# Patient Record
Sex: FEMALE | Race: WHITE | NOT HISPANIC OR LATINO | Employment: FULL TIME | URBAN - METROPOLITAN AREA
[De-identification: names, ages, dates, MRNs, and addresses within clinical notes are randomized per-mention and may not be internally consistent; named-entity substitution may affect disease eponyms.]

---

## 2023-05-29 ENCOUNTER — HOSPITAL ENCOUNTER (EMERGENCY)
Facility: HOSPITAL | Age: 33
Discharge: HOME | End: 2023-05-29
Attending: EMERGENCY MEDICINE | Admitting: EMERGENCY MEDICINE

## 2023-05-29 VITALS
HEART RATE: 87 BPM | DIASTOLIC BLOOD PRESSURE: 104 MMHG | BODY MASS INDEX: 46.1 KG/M2 | RESPIRATION RATE: 18 BRPM | HEIGHT: 64 IN | WEIGHT: 270 LBS | OXYGEN SATURATION: 98 % | TEMPERATURE: 98.3 F | SYSTOLIC BLOOD PRESSURE: 149 MMHG
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
## 2023-05-29 DIAGNOSIS — R03.0 ELEVATED BLOOD PRESSURE READING: ICD-10-CM

## 2023-05-29 DIAGNOSIS — L03.114 CELLULITIS OF LEFT FOREARM: ICD-10-CM

## 2023-05-29 PROCEDURE — 99283 EMERGENCY DEPT VISIT LOW MDM: CPT

## 2023-05-29 PROCEDURE — 63710000001 PREDNISONE PER 1 MG: Performed by: EMERGENCY MEDICINE

## 2023-05-29 RX ORDER — SULFAMETHOXAZOLE AND TRIMETHOPRIM 800; 160 MG/1; MG/1
1 TABLET ORAL ONCE
Status: COMPLETED | OUTPATIENT
Start: 2023-05-29 | End: 2023-05-29

## 2023-05-29 RX ORDER — IBUPROFEN 400 MG/1
400 TABLET ORAL EVERY 6 HOURS PRN
Qty: 20 TABLET | Refills: 0 | Status: SHIPPED | OUTPATIENT
Start: 2023-05-29

## 2023-05-29 RX ORDER — FLUCONAZOLE 150 MG/1
150 TABLET ORAL ONCE
Qty: 1 TABLET | Refills: 0 | Status: SHIPPED | OUTPATIENT
Start: 2023-05-29 | End: 2023-05-29

## 2023-05-29 RX ORDER — SULFAMETHOXAZOLE AND TRIMETHOPRIM 800; 160 MG/1; MG/1
1 TABLET ORAL 2 TIMES DAILY
Qty: 14 TABLET | Refills: 0 | Status: SHIPPED | OUTPATIENT
Start: 2023-05-29

## 2023-05-29 RX ORDER — PREDNISONE 20 MG/1
40 TABLET ORAL ONCE
Status: COMPLETED | OUTPATIENT
Start: 2023-05-29 | End: 2023-05-29

## 2023-05-29 RX ADMIN — PREDNISONE 40 MG: 20 TABLET ORAL at 20:42

## 2023-05-29 RX ADMIN — SULFAMETHOXAZOLE AND TRIMETHOPRIM 1 TABLET: 800; 160 TABLET ORAL at 20:42

## 2023-05-30 NOTE — ED PROVIDER NOTES
Subjective   History of Present Illness  Patient is a 32-year-old female presenting with insect bites.  Patient feels like that they are spider bites.  She first noticed one between her first and second toe.  She then noticed one of the base of the right foot.  She has since had 1 on the left forearm.  Patient started some doxycycline she has had leftover and is taken 4 doses.  Patient reports swelling and erythema of the one on the left arm.  No fever or chills.  Patient did not witness any spiders.    History provided by:  Patient      Review of Systems    No past medical history on file.    Allergies   Allergen Reactions   • Eucalyptol Anaphylaxis   • Shellfish-Derived Products Anaphylaxis   • Benadryl [Diphenhydramine] Hallucinations   • Tomato GI Intolerance   • Trazodone Hallucinations   • Vistaril [Hydroxyzine] Hallucinations       No past surgical history on file.    No family history on file.    Social History     Socioeconomic History   • Marital status: Single           Objective   Physical Exam  Vitals and nursing note reviewed.   Constitutional:       Appearance: Normal appearance. She is obese.   Pulmonary:      Effort: No respiratory distress.   Musculoskeletal:         General: Normal range of motion.   Skin:     General: Skin is warm and dry.      Comments: Appearance of resolving insect bite between the first and second toe of the left foot.  Insect bite on the dorsal aspect of the right foot at the base of the toes.  Another bite on the left forearm on the dorsal aspect with surrounding erythema and mild swelling.  No fluctuance or evidence of active abscess.  Neurovascular intact   Neurological:      Mental Status: She is alert and oriented to person, place, and time.   Psychiatric:         Mood and Affect: Mood normal.         Behavior: Behavior normal.         Procedures           ED Course  ED Course as of 05/29/23 2031   Mon May 29, 2023   2030 Patient with evidence of multiple insect bites.   I did review some old records and the patient has been seen at other hospitals for cellulitis and insect bites with most recent being abdominal cellulitis from insect bite.  See that note for details.  Otherwise, the foot.  Seem to be progressing well.  The left arm does demonstrate the surrounding cellulitis approximately 8 cm x 12 cm.  No evidence of abscess or active fluid collection.  Will discharge with antibiotic therapy to follow-up with symptomatic management.  Patient is to return to the ER for any concerns or worsening. [RS]      ED Course User Index  [RS] Cliff Kirk MD                                           Medical Decision Making  Risk  Prescription drug management.          Final diagnoses:   Insect bite, unspecified site, initial encounter   Cellulitis of left forearm   Elevated blood pressure reading       ED Disposition  ED Disposition     ED Disposition   Discharge    Condition   Stable    Comment   --             [facility] Emergency Department  [address] [id number] [phone]    As needed, If symptoms worsen or ANY concerns.    [facility]IENT CONNE[facility] - [place] [address]  [phone]  Schedule an appointment as soon as possible for a visit            Medication List      New Prescriptions    fluconazole 150 MG tablet  Commonly known as: DIFLUCAN  Take 1 tablet by mouth 1 (One) Time for 1 dose. Upon completion of antibiotics     ibuprofen 400 MG tablet  Commonly known as: IBU  Take 1 tablet by mouth Every 6 (Six) Hours As Needed for Mild Pain.     sulfamethoxazole-trimethoprim 800-160 MG per tablet  Commonly known as: BACTRIM DS,SEPTRA DS  Take 1 tablet by mouth 2 (Two) Times a Day.           Where to Get Your Medications      These medications were sent to [facility] [id number] - [place], [place] - [address] AT [facility] [address] - [phone]  - [phone] FX  [address], [facility]  [address]    Phone: [phone]   · fluconazole 150 MG tablet  · ibuprofen 400 MG tablet  · sulfamethoxazole-trimethoprim 800-160 MG per tablet          Cliff Kirk MD  05/29/23 2031

## 2023-06-10 ENCOUNTER — HOSPITAL ENCOUNTER (EMERGENCY)
Facility: HOSPITAL | Age: 33
Discharge: HOME | End: 2023-06-10
Attending: EMERGENCY MEDICINE
Payer: COMMERCIAL

## 2023-06-10 VITALS
BODY MASS INDEX: 46.1 KG/M2 | SYSTOLIC BLOOD PRESSURE: 163 MMHG | RESPIRATION RATE: 16 BRPM | DIASTOLIC BLOOD PRESSURE: 110 MMHG | TEMPERATURE: 97.8 F | OXYGEN SATURATION: 98 % | HEART RATE: 93 BPM | WEIGHT: 270 LBS | HEIGHT: 64 IN

## 2023-06-10 DIAGNOSIS — N93.8 DYSFUNCTIONAL UTERINE BLEEDING: Primary | ICD-10-CM

## 2023-06-10 LAB
B-HCG UR QL: NEGATIVE
EXPIRATION DATE: NORMAL
INTERNAL NEGATIVE CONTROL: NORMAL
INTERNAL POSITIVE CONTROL: NORMAL
Lab: NORMAL

## 2023-06-10 PROCEDURE — 99283 EMERGENCY DEPT VISIT LOW MDM: CPT

## 2023-06-10 PROCEDURE — 81025 URINE PREGNANCY TEST: CPT | Performed by: PHYSICIAN ASSISTANT

## 2023-06-10 RX ORDER — BUSPIRONE HYDROCHLORIDE 15 MG/1
1 TABLET ORAL EVERY 12 HOURS SCHEDULED
COMMUNITY
Start: 2023-05-09

## 2023-06-10 RX ORDER — CARIPRAZINE 4.5 MG/1
4.5 CAPSULE, GELATIN COATED ORAL DAILY
COMMUNITY

## 2023-06-10 RX ORDER — ALBUTEROL SULFATE 90 UG/1
2 AEROSOL, METERED RESPIRATORY (INHALATION) EVERY 4 HOURS PRN
COMMUNITY
Start: 2022-12-27

## 2023-06-10 RX ORDER — LAMOTRIGINE 25 MG/1
2 TABLET ORAL DAILY
COMMUNITY
Start: 2023-05-09

## 2023-06-10 RX ORDER — QUETIAPINE FUMARATE 50 MG/1
50 TABLET, FILM COATED ORAL
COMMUNITY

## 2023-06-10 NOTE — Clinical Note
[facility] EMERGENCY DEPARTMENT  [address] [facility] [address]  Phone: [phone]    Andrew Truong was seen and treated in our emergency department on 6/10/2023.  She may return to work on 06/12/2023.         Thank you for choosing [facility].    See Hein PA

## 2023-06-10 NOTE — ED PROVIDER NOTES
Subjective   History of Present Illness  32-year-old female presents emergency department today with abnormal vaginal bleeding.  She reports she had 2 positive pregnancy test at home and she began having a bit of bleeding.  She is having no abdominal pain.  She reports that she had miscarriages in the past.  She reports that this moment the bleeding seems to have stopped.  She has had no dysuria frequency urgency or hematuria.  She denies any vaginal discharge no other complaints.  She is 6 days late on her menstrual cycle sexually active and concerned about pregnancy.    History provided by:  Patient   used: No    Vaginal Bleeding  Quality:  Bright red  Severity:  Mild  Onset quality:  Sudden  Duration:  2 hours  Timing:  Intermittent  Progression:  Resolved  Chronicity:  New  Menstrual history:  Irregular  Number of pads used:  2  Possible pregnancy: yes    Context: spontaneously    Relieved by:  Nothing  Worsened by:  Nothing  Ineffective treatments:  None tried  Associated symptoms: no abdominal pain, no back pain, no dysuria, no fatigue, no nausea and no vaginal discharge    Risk factors: unprotected sex    Risk factors: no bleeding disorder, no hx of endometriosis, no gynecological surgery, does not have multiple partners, no ovarian cysts, no prior miscarriage, no STD exposure and no terminated pregnancies      Review of Systems   Constitutional:  Negative for chills and fatigue.   Respiratory:  Negative for chest tightness and shortness of breath.    Cardiovascular:  Negative for chest pain and palpitations.   Gastrointestinal:  Negative for abdominal pain and nausea.   Genitourinary:  Positive for vaginal bleeding. Negative for dysuria and vaginal discharge.   Musculoskeletal:  Negative for back pain and neck pain.   Skin:  Negative for pallor and rash.   Psychiatric/Behavioral: Negative.     All other systems reviewed and are negative.    Past Medical History:   Diagnosis Date     Anxiety     Bipolar 2 disorder     Depression     Fibromyalgia     Injury of back     Prediabetes     PTSD (post-traumatic stress disorder)        Allergies   Allergen Reactions    Eucalyptol Anaphylaxis    Shellfish-Derived Products Anaphylaxis    Benadryl [Diphenhydramine] Hallucinations    Tomato GI Intolerance    Trazodone Hallucinations    Vistaril [Hydroxyzine] Hallucinations       Past Surgical History:   Procedure Laterality Date    CHOLECYSTECTOMY         History reviewed. No pertinent family history.    Social History     Socioeconomic History    Marital status: Single   Tobacco Use    Smoking status: Never   Substance and Sexual Activity    Alcohol use: Never    Drug use: Never    Sexual activity: Yes     Birth control/protection: None           Objective   Physical Exam  Vitals and nursing note reviewed.   Constitutional:       General: She is not in acute distress.     Appearance: She is well-developed. She is not diaphoretic.   HENT:      Head: Normocephalic and atraumatic.      Nose: Nose normal.      Mouth/Throat:      Mouth: Mucous membranes are moist.   Eyes:      General: No scleral icterus.     Conjunctiva/sclera: Conjunctivae normal.   Pulmonary:      Effort: Pulmonary effort is normal. No respiratory distress.   Musculoskeletal:         General: Normal range of motion.      Cervical back: Normal range of motion and neck supple.   Skin:     General: Skin is warm and dry.   Neurological:      Mental Status: She is alert and oriented to person, place, and time.   Psychiatric:         Behavior: Behavior normal.       Procedures           ED Course                Recent Results (from the past 24 hour(s))   POC Urine Pregnancy    Collection Time: 06/10/23  7:24 PM    Specimen: Urine   Result Value Ref Range    HCG, Urine, QL Negative Negative    Lot Number [id number]     Internal Positive Control Passed Positive, Passed    Internal Negative Control Passed Negative, Passed    Expiration Date 2024-07-27   "    Note: In addition to lab results from this visit, the labs listed above may include labs taken at another facility or during a different encounter within the last 24 hours. Please correlate lab times with ED admission and discharge times for further clarification of the services performed during this visit.    No orders to display     Vitals:    06/10/23 1758   BP: (!) 163/110   BP Location: Left arm   Patient Position: Sitting   Pulse: 93   Resp: 16   Temp: 97.8 °F (36.6 °C)   TempSrc: Oral   SpO2: 98%   Weight: 122 kg (270 lb)   Height: 162.6 cm (64\")     Medications - No data to display  ECG/EMG Results (last 24 hours)       ** No results found for the last 24 hours. **          No orders to display                                  Medical Decision Making  Patient here concerned that she may be pregnant.  Had 2 positive pregnancy test at home.  She had some mild vaginal bleeding.  No clots.  Patient tested was negative.  She would like to get further work-up done for her dysfunctional uterine bleeding as she is 6 days late on her menses.  She was referred to GYN on-call.    Amount and/or Complexity of Data Reviewed  Labs: ordered. Decision-making details documented in ED Course.        Final diagnoses:   Dysfunctional uterine bleeding       ED Disposition  ED Disposition       ED Disposition   Discharge    Condition   Stable    Comment   --               Merry Watt MD  [address] [id number] [phone]      Call for appointment         Medication List      No changes were made to your prescriptions during this visit.            See Hein PA  06/10/23 2000    "